# Patient Record
Sex: MALE | Race: WHITE | Employment: FULL TIME | URBAN - METROPOLITAN AREA
[De-identification: names, ages, dates, MRNs, and addresses within clinical notes are randomized per-mention and may not be internally consistent; named-entity substitution may affect disease eponyms.]

---

## 2018-04-05 ENCOUNTER — OFFICE VISIT (OUTPATIENT)
Dept: PODIATRY | Facility: CLINIC | Age: 63
End: 2018-04-05
Payer: COMMERCIAL

## 2018-04-05 VITALS
SYSTOLIC BLOOD PRESSURE: 131 MMHG | RESPIRATION RATE: 17 BRPM | WEIGHT: 185 LBS | DIASTOLIC BLOOD PRESSURE: 86 MMHG | HEIGHT: 67 IN | HEART RATE: 72 BPM | BODY MASS INDEX: 29.03 KG/M2

## 2018-04-05 DIAGNOSIS — Q66.52 CONGENITAL PES PLANUS OF LEFT FOOT: ICD-10-CM

## 2018-04-05 DIAGNOSIS — Q66.51 CONGENITAL PES PLANUS OF RIGHT FOOT: ICD-10-CM

## 2018-04-05 DIAGNOSIS — B35.1 ONYCHOMYCOSIS: ICD-10-CM

## 2018-04-05 DIAGNOSIS — M20.21 HALLUX RIGIDUS OF BOTH FEET: ICD-10-CM

## 2018-04-05 DIAGNOSIS — M21.969 ACQUIRED DEFORMITY OF FOOT, UNSPECIFIED LATERALITY: Primary | ICD-10-CM

## 2018-04-05 DIAGNOSIS — M20.22 HALLUX RIGIDUS OF BOTH FEET: ICD-10-CM

## 2018-04-05 DIAGNOSIS — B35.3 TINEA PEDIS OF BOTH FEET: ICD-10-CM

## 2018-04-05 PROCEDURE — L3000 FT INSERT UCB BERKELEY SHELL: HCPCS | Performed by: PODIATRIST

## 2018-04-05 PROCEDURE — 20605 DRAIN/INJ JOINT/BURSA W/O US: CPT | Performed by: PODIATRIST

## 2018-04-05 PROCEDURE — 99202 OFFICE O/P NEW SF 15 MIN: CPT | Performed by: PODIATRIST

## 2018-04-05 RX ORDER — TERBINAFINE HYDROCHLORIDE 250 MG/1
250 TABLET ORAL DAILY
Qty: 30 TABLET | Refills: 0 | Status: SHIPPED | OUTPATIENT
Start: 2018-04-05 | End: 2018-05-05

## 2018-04-05 NOTE — PROGRESS NOTES
Assessment/Plan:  Pain upon ambulation  Pes planus  Hallux rigidus  Mycosis of nail and skin  Plan  Patient educated on condition  Patient be started on oral and topical antifungal agent  Patient's feet have been casted for custom molded foot orthotics  Right 1st MPJ arthrocentesis done  1 cc of Kenalog 10 was injected without pain or complication  No problem-specific Assessment & Plan notes found for this encounter  Radha Paul MD - 08/17/2017 5:30 PM EDT  Formatting of this note may be different from the original   Subjective : The patient presents to the office with several complaints  Chief Complaint   Patient presents with    Eye Problem   Patient presents for left eye issue, patient thinks it may be spider bite     HPI  Aissatou aGry is a 62 y o  male who presents for :    1  Left eye pain  The the patient complains of left lower eyelid discomfort for the past 3-4 days  He denies trauma or injury  He is not sure if there was an insect bite  There is no trouble with his vision  He has not tried any over-the-counter treatment  2  Right forearm pain  He states he has developed pain in the right forearm over the extensor surface, over the past week  There has been no trauma, though upon further questioning he states, while working as a , he has been holding the button on his radio while singing for hours  3  Chest pain, unspecified type  The patient describes a bandlike feeling around his chest lasting a short duration, and intermittent for an unspecified time frame  This does not happen every day or every week  He does lift weights, somewhat heavy at times  There is no other radiation of the pain, and no socially aided symptoms      Past Medical History:   Diagnosis Date    Alopecia    Diverticulitis    Dizziness    Fatigue    Low back pain    Visual disturbance   Unspecified, NOS     Past Surgical History:   Procedure Laterality Date    COLONOSCOPY    HAIR TRANSPLANT Family History   Problem Relation Age of Onset    COPD Mother    No Known Problems Sister    No Known Problems Brother    No Known Problems Daughter    No Known Problems Son    No Known Problems Brother    No Known Problems Sister    No Known Problems Sister     Social History  Social History   Substance Use Topics    Smoking status: Never Smoker    Smokeless tobacco: Never Used    Alcohol use No     Allergies   Allergen Reactions    Penicillins     Current Outpatient Prescriptions   Medication Sig Dispense Refill    finasteride (PROSCAR) 5 mg tablet Take 1 tablet (5 mg total) by mouth daily  90 tablet 3    levoFLOXacin (LEVAQUIN) 500 mg tablet Take 1 tablet (500 mg total) by mouth daily  10 tablet 3    metroNIDAZOLE (FLAGYL) 250 mg tablet Take 1 tablet (250 mg total) by mouth 3 times a day  21 tablet 3     No current facility-administered medications for this visit  Review of Systems   Constitutional: Negative for chills and fever  HENT: Negative for ear pain and sore throat  Eyes: Negative for photophobia and pain  Respiratory: Negative for cough and shortness of breath  Cardiovascular: Negative for chest pain, palpitations and leg swelling  Gastrointestinal: Negative for abdominal pain and nausea  Endocrine: Negative for polydipsia, polyphagia and polyuria  Genitourinary: Negative for dysuria, flank pain, hematuria and urgency  Skin: Negative for rash and wound  Neurological: Negative for dizziness, seizures and headaches  Hematological: Negative for adenopathy  Does not bruise/bleed easily  Psychiatric/Behavioral: Negative for confusion and hallucinations  There are no diagnoses linked to this encounter  Subjective:      Patient ID: Olu Dempsey is a 65 y o  male  Patient has fungus of toenail  This has been there for years  Patient also has pain in his right big toe joint  This has been ongoing for years  No history of occult trauma          The following portions of the patient's history were reviewed and updated as appropriate: allergies, current medications, past family history, past medical history, past social history, past surgical history and problem list     Review of Systems      Objective: Foot Exam    General  General Appearance: appears stated age and healthy   Orientation: alert and oriented to person, place, and time   Affect: appropriate   Gait: antalgic       Right Foot/Ankle     Inspection and Palpation  Ecchymosis: none  Tenderness: great toe metatarsophalangeal joint   Swelling: great toe interphalangeal joint   Arch: pes planus  Hallux valgus: no  Hallux limitus: yes  Skin Exam: callus, dry skin and tinea; Neurovascular  Dorsalis pedis: 1+  Posterior tibial: 1+  Saphenous nerve sensation: normal  Tibial nerve sensation: normal  Superficial peroneal nerve sensation: normal  Deep peroneal nerve sensation: normal  Sural nerve sensation: normal  Achilles reflex: 2+  Babinski reflex: 2+    Comments  Patient has severe hallux rigidus of the right foot  Minimal range of motion of right 1st MPJNoted  Left Foot/Ankle      Inspection and Palpation  Ecchymosis: none  Tenderness: none   Arch: pes planus  Hallux valgus: no  Hallux limitus: yes  Skin Exam: callus, dry skin and tinea; Neurovascular  Dorsalis pedis: 1+  Posterior tibial: 1+  Saphenous nerve sensation: normal  Tibial nerve sensation: normal  Superficial peroneal nerve sensation: normal  Deep peroneal nerve sensation: normal  Sural nerve sensation: normal  Achilles reflex: 2+  Babinski reflex: 2+        Physical Exam   Constitutional: He appears well-developed  Cardiovascular: Normal rate and regular rhythm  Pulses:       Dorsalis pedis pulses are 1+ on the right side, and 1+ on the left side  Posterior tibial pulses are 1+ on the right side, and 1+ on the left side  Feet:   Right Foot:   Skin Integrity: Positive for callus and dry skin     Left Foot:   Skin Integrity: Positive for callus and dry skin  Neurological:   Reflex Scores:       Achilles reflexes are 2+ on the right side and 2+ on the left side  Skin:   Mycosis of hallux nail noted bilateral   Nails are thickened with yellow  There is lysis of the distal aspect

## 2018-05-10 ENCOUNTER — OFFICE VISIT (OUTPATIENT)
Dept: PODIATRY | Facility: CLINIC | Age: 63
End: 2018-05-10
Payer: COMMERCIAL

## 2018-05-10 VITALS
WEIGHT: 185 LBS | HEIGHT: 67 IN | DIASTOLIC BLOOD PRESSURE: 84 MMHG | SYSTOLIC BLOOD PRESSURE: 131 MMHG | HEART RATE: 74 BPM | BODY MASS INDEX: 29.03 KG/M2 | RESPIRATION RATE: 16 BRPM

## 2018-05-10 DIAGNOSIS — B35.3 TINEA PEDIS OF BOTH FEET: ICD-10-CM

## 2018-05-10 DIAGNOSIS — M20.21 HALLUX RIGIDUS OF BOTH FEET: ICD-10-CM

## 2018-05-10 DIAGNOSIS — M21.969 ACQUIRED DEFORMITY OF FOOT, UNSPECIFIED LATERALITY: Primary | ICD-10-CM

## 2018-05-10 DIAGNOSIS — M77.42 METATARSALGIA OF BOTH FEET: ICD-10-CM

## 2018-05-10 DIAGNOSIS — M20.22 HALLUX RIGIDUS OF BOTH FEET: ICD-10-CM

## 2018-05-10 DIAGNOSIS — M77.41 METATARSALGIA OF BOTH FEET: ICD-10-CM

## 2018-05-10 DIAGNOSIS — B35.1 ONYCHOMYCOSIS: ICD-10-CM

## 2018-05-10 PROCEDURE — 99213 OFFICE O/P EST LOW 20 MIN: CPT | Performed by: PODIATRIST

## 2018-05-10 RX ORDER — TERBINAFINE HYDROCHLORIDE 250 MG/1
250 TABLET ORAL DAILY
Qty: 30 TABLET | Refills: 0 | Status: SHIPPED | OUTPATIENT
Start: 2018-05-10 | End: 2018-06-09

## 2018-05-10 NOTE — PROGRESS NOTES
Assessment/Plan:  Mycosis, resolving  Pes planus  Pain  Hallux rigidus  Plan  Refill medication ordered  Patient will use orthotics daily  No problem-specific Assessment & Plan notes found for this encounter  There are no diagnoses linked to this encounter  Subjective:      Patient ID: Joyce Roberts is a 65 y o  male  Patient is doing better  He took medication as prescribed  He only has occasional pain in both big toe joints  The following portions of the patient's history were reviewed and updated as appropriate: allergies, current medications, past family history, past medical history, past social history, past surgical history and problem list     Review of Systems      Objective: Foot ExamPhysical Exam   Skin:   Mycosis of nail resolving slowly    Line of demarcation noted

## 2019-08-07 ENCOUNTER — OFFICE VISIT (OUTPATIENT)
Dept: PODIATRY | Facility: CLINIC | Age: 64
End: 2019-08-07
Payer: COMMERCIAL

## 2019-08-07 DIAGNOSIS — B35.1 ONYCHOMYCOSIS: ICD-10-CM

## 2019-08-07 DIAGNOSIS — L03.032 CELLULITIS OF GREAT TOE, LEFT: Primary | ICD-10-CM

## 2019-08-07 DIAGNOSIS — L60.0 INGROWN TOENAIL: ICD-10-CM

## 2019-08-07 PROCEDURE — 99213 OFFICE O/P EST LOW 20 MIN: CPT | Performed by: PODIATRIST

## 2019-08-07 RX ORDER — FINASTERIDE 5 MG/1
TABLET, FILM COATED ORAL
COMMUNITY
Start: 2019-08-05

## 2019-08-07 RX ORDER — DOXYCYCLINE HYCLATE 100 MG/1
100 CAPSULE ORAL EVERY 12 HOURS SCHEDULED
Qty: 14 CAPSULE | Refills: 0 | Status: SHIPPED | OUTPATIENT
Start: 2019-08-07 | End: 2019-08-14

## 2019-08-07 NOTE — PROGRESS NOTES
Assessment/Plan:    Partial nail avulsion left hallux tibial border under ethyl chloride anesthesia  Applied Neosporin dressing    Patient will soak foot in warm water and Epsom salt daily Neosporin dressing daily discussed possible need for matricectomy  Discussed that he needs to go for blood work Rx CMP will need to discontinue herbal supplements before beginning Lamisil therapy  Discussed risk of liver damage discussed side effects of medication  Discussed that he will need Lamisil for  3 months for treatment of nail fungus  Follow-up 1 week     Diagnoses and all orders for this visit:    Cellulitis of great toe, left  -     doxycycline hyclate (VIBRAMYCIN) 100 mg capsule; Take 1 capsule (100 mg total) by mouth every 12 (twelve) hours for 7 days    Ingrown toenail    Onychomycosis    Other orders  -     finasteride (PROSCAR) 5 mg tablet; TAKE 1 TABLET DAILY          Subjective:      Patient ID: Claire Muniz is a 59 y o  male  Patient is a  patient is a chronic ingrown nail of the left big toe medial border for the past month  Patient has not been treating this  Toe is red and swollen  There is significant pain  Patient also has significant onychomycosis big toes bilateral   Has tried topical over-the-counter for several months but this has not been effective  Patient does weight lifting patient takes creatinine and numerous vitamin and herbal supplements  Patient does not drink alcohol      No past medical history on file  Current Outpatient Medications:     finasteride (PROSCAR) 5 mg tablet, TAKE 1 TABLET DAILY, Disp: , Rfl:     doxycycline hyclate (VIBRAMYCIN) 100 mg capsule, Take 1 capsule (100 mg total) by mouth every 12 (twelve) hours for 7 days, Disp: 14 capsule, Rfl: 0    No past surgical history on file      Allergies   Allergen Reactions    Penicillins        Patient Active Problem List   Diagnosis    Acquired deformity of foot    Hallux rigidus of both feet    Congenital pes planus of right foot    Congenital pes planus of left foot    Onychomycosis    Tinea pedis of both feet    Metatarsalgia of both feet       Review of Systems   Constitutional: Negative  HENT: Negative  Eyes: Negative  Respiratory: Negative  Cardiovascular: Negative  Gastrointestinal: Negative  Endocrine: Negative  Genitourinary: Negative  Musculoskeletal: Negative  Skin: Negative  Allergic/Immunologic: Negative  Neurological: Negative  Hematological: Negative  Psychiatric/Behavioral: Negative  Objective:  Patient's shoes and socks were removed, feet examined  There were no vitals taken for this visit  Foot Exam    General  General Appearance: appears stated age and healthy   Orientation: alert and oriented to person, place, and time   Affect: appropriate   Gait: unimpaired       Right Foot/Ankle     Inspection and Palpation  Arch: pes planus  Hallux limitus: yes    Neurovascular  Dorsalis pedis: 2+  Posterior tibial: 2+      Left Foot/Ankle      Inspection and Palpation  Arch: pes planus  Hallux limitus: yes    Neurovascular  Dorsalis pedis: 2+  Posterior tibial: 2+          Right Foot/Ankle   Right Foot Inspection        Vascular    The right DP pulse is 2+  The right PT pulse is 2+  Right Toe  - Comprehensive Exam  Arch: pes planus  Hallux limitus: yes    Left Foot/Ankle  Left Foot Inspection                                             Vascular    The left DP pulse is 2+  The left PT pulse is 2+  Left Toe  - Comprehensive Exam  Arch: pes planus  Hallux limitus: yes         Physical Exam   Cardiovascular:   Pulses:       Dorsalis pedis pulses are 2+ on the right side, and 2+ on the left side  Posterior tibial pulses are 2+ on the right side, and 2+ on the left side           Procedures     Left hallux tibial border positive ingrown, positive paronychia positive erythema to IPJ, negative purulence negative ascending cellulitis    Bilateral hallux nails are thickened, discolored, dystrophic  Mild hammertoes bilateral  Moderate pes planus bilateral  Muscle strength 5/5 all groups bilateral feet and ankles

## 2019-08-21 ENCOUNTER — OFFICE VISIT (OUTPATIENT)
Dept: PODIATRY | Facility: CLINIC | Age: 64
End: 2019-08-21
Payer: COMMERCIAL

## 2019-08-21 DIAGNOSIS — L60.0 INGROWN TOENAIL: ICD-10-CM

## 2019-08-21 DIAGNOSIS — B35.1 ONYCHOMYCOSIS: Primary | ICD-10-CM

## 2019-08-21 PROCEDURE — 99213 OFFICE O/P EST LOW 20 MIN: CPT | Performed by: PODIATRIST

## 2019-08-21 RX ORDER — TERBINAFINE HYDROCHLORIDE 250 MG/1
250 TABLET ORAL DAILY
Qty: 30 TABLET | Refills: 1 | Status: SHIPPED | OUTPATIENT
Start: 2019-08-21 | End: 2019-09-20

## 2019-08-21 NOTE — PROGRESS NOTES
Assessment/Plan:    Patient will not begin pill in till after blood work  Discussed risk of liver damage discussed side effects of medication  Patient will repeat blood work after being on medication for 1 month  Follow-up 1 month     Diagnoses and all orders for this visit:    Onychomycosis  -     terbinafine (LamISIL) 250 mg tablet; Take 1 tablet (250 mg total) by mouth daily for 30 days    Ingrown toenail          Subjective:      Patient ID: Fredis Wright is a 59 y o  male  Patient is follow-up for ingrown nail left big toe  Patient finish course of antibiotics  Patient has had chronic nail fungus for several years  No past medical history on file  Current Outpatient Medications:     finasteride (PROSCAR) 5 mg tablet, TAKE 1 TABLET DAILY, Disp: , Rfl:     terbinafine (LamISIL) 250 mg tablet, Take 1 tablet (250 mg total) by mouth daily for 30 days, Disp: 30 tablet, Rfl: 1    No past surgical history on file  Allergies   Allergen Reactions    Penicillins        Patient Active Problem List   Diagnosis    Acquired deformity of foot    Hallux rigidus of both feet    Congenital pes planus of right foot    Congenital pes planus of left foot    Onychomycosis    Tinea pedis of both feet    Metatarsalgia of both feet       Review of Systems   Constitutional: Negative  HENT: Negative  Eyes: Negative  Respiratory: Negative  Cardiovascular: Negative  Gastrointestinal: Negative  Endocrine: Negative  Genitourinary: Negative  Musculoskeletal: Negative  Skin: Negative  Allergic/Immunologic: Negative  Neurological: Negative  Hematological: Negative  Psychiatric/Behavioral: Negative  Objective:  Patient's shoes and socks were removed, feet examined  There were no vitals taken for this visit        Foot Exam    General  General Appearance: appears stated age and healthy   Orientation: alert and oriented to person, place, and time   Affect: appropriate Gait: unimpaired       Right Foot/Ankle     Inspection and Palpation  Arch: pes planus  Hallux limitus: yes    Neurovascular  Dorsalis pedis: 2+  Posterior tibial: 2+      Left Foot/Ankle      Inspection and Palpation  Arch: pes planus  Hallux limitus: yes    Neurovascular  Dorsalis pedis: 2+  Posterior tibial: 2+          Right Foot/Ankle   Right Foot Inspection        Vascular    The right DP pulse is 2+  The right PT pulse is 2+  Right Toe  - Comprehensive Exam  Arch: pes planus  Hallux limitus: yes    Left Foot/Ankle  Left Foot Inspection                                             Vascular    The left DP pulse is 2+  The left PT pulse is 2+  Left Toe  - Comprehensive Exam  Arch: pes planus  Hallux limitus: yes         Physical Exam   Cardiovascular:   Pulses:       Dorsalis pedis pulses are 2+ on the right side, and 2+ on the left side  Posterior tibial pulses are 2+ on the right side, and 2+ on the left side           Procedures     Left hallux tibial border resolving ingrown, no erythema no exudate no signs of infection  Bilateral hallux nails are thickened, discolored, dystrophic  Mild hammertoes bilateral  Moderate pes planus bilateral  Muscle strength 5/5 all groups bilateral feet and ankles

## 2019-08-29 LAB
ALBUMIN SERPL-MCNC: 4.5 G/DL (ref 3.6–4.8)
ALBUMIN/GLOB SERPL: 1.8 {RATIO} (ref 1.2–2.2)
ALP SERPL-CCNC: 62 IU/L (ref 39–117)
ALT SERPL-CCNC: 21 IU/L (ref 0–44)
AST SERPL-CCNC: 25 IU/L (ref 0–40)
BILIRUB SERPL-MCNC: 0.5 MG/DL (ref 0–1.2)
BUN SERPL-MCNC: 14 MG/DL (ref 8–27)
BUN/CREAT SERPL: 14 (ref 10–24)
CALCIUM SERPL-MCNC: 9.5 MG/DL (ref 8.6–10.2)
CHLORIDE SERPL-SCNC: 104 MMOL/L (ref 96–106)
CO2 SERPL-SCNC: 23 MMOL/L (ref 20–29)
CREAT SERPL-MCNC: 1.02 MG/DL (ref 0.76–1.27)
GLOBULIN SER-MCNC: 2.5 G/DL (ref 1.5–4.5)
GLUCOSE SERPL-MCNC: 105 MG/DL (ref 65–99)
POTASSIUM SERPL-SCNC: 4.8 MMOL/L (ref 3.5–5.2)
PROT SERPL-MCNC: 7 G/DL (ref 6–8.5)
SL AMB EGFR AFRICAN AMERICAN: 91 ML/MIN/1.73
SL AMB EGFR NON AFRICAN AMERICAN: 78 ML/MIN/1.73
SODIUM SERPL-SCNC: 141 MMOL/L (ref 134–144)

## 2019-09-03 ENCOUNTER — TELEPHONE (OUTPATIENT)
Dept: PODIATRY | Facility: CLINIC | Age: 64
End: 2019-09-03

## 2023-06-15 ENCOUNTER — HOSPITAL ENCOUNTER (OUTPATIENT)
Dept: RADIOLOGY | Facility: HOSPITAL | Age: 68
End: 2023-06-15
Payer: OTHER MISCELLANEOUS

## 2023-06-15 DIAGNOSIS — T88.7XXA DRUG-INDUCED DISORDER OF REFRACTION AND ACCOMMODATION: ICD-10-CM

## 2023-06-15 DIAGNOSIS — H53.8 DRUG-INDUCED DISORDER OF REFRACTION AND ACCOMMODATION: ICD-10-CM

## 2023-06-15 DIAGNOSIS — S06.0X0A CONCUSSION WITHOUT LOSS OF CONSCIOUSNESS, INITIAL ENCOUNTER: ICD-10-CM

## 2023-06-15 DIAGNOSIS — S20.223A CONTUSION OF BOTH SIDES OF BACK WALL OF THORAX, INITIAL ENCOUNTER: ICD-10-CM
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
## 2023-06-15 PROCEDURE — 72070 X-RAY EXAM THORAC SPINE 2VWS: CPT

## 2023-06-15 PROCEDURE — 72114 X-RAY EXAM L-S SPINE BENDING: CPT

## 2023-06-15 PROCEDURE — 72170 X-RAY EXAM OF PELVIS: CPT

## 2024-05-16 ENCOUNTER — APPOINTMENT (OUTPATIENT)
Dept: LAB | Facility: CLINIC | Age: 69
End: 2024-05-16
Payer: MEDICARE

## 2024-05-16 DIAGNOSIS — R53.83 OTHER FATIGUE: ICD-10-CM

## 2024-05-16 DIAGNOSIS — M25.569 KNEE PAIN, UNSPECIFIED CHRONICITY, UNSPECIFIED LATERALITY: ICD-10-CM

## 2024-05-16 DIAGNOSIS — Z12.5 SPECIAL SCREENING FOR MALIGNANT NEOPLASM OF PROSTATE: ICD-10-CM

## 2024-05-16 DIAGNOSIS — E78.5 HYPERLIPIDEMIA, UNSPECIFIED HYPERLIPIDEMIA TYPE: ICD-10-CM

## 2024-05-16 DIAGNOSIS — G47.00 INSOMNIA, UNSPECIFIED TYPE: ICD-10-CM

## 2024-05-16 LAB — TSH SERPL DL<=0.05 MIU/L-ACNC: 5.11 UIU/ML (ref 0.45–4.5)

## 2024-05-16 PROCEDURE — 84443 ASSAY THYROID STIM HORMONE: CPT

## 2024-07-10 ENCOUNTER — OFFICE VISIT (OUTPATIENT)
Dept: URGENT CARE | Facility: CLINIC | Age: 69
End: 2024-07-10
Payer: MEDICARE

## 2024-07-10 VITALS
TEMPERATURE: 97.9 F | WEIGHT: 190 LBS | HEIGHT: 66 IN | OXYGEN SATURATION: 98 % | HEART RATE: 72 BPM | SYSTOLIC BLOOD PRESSURE: 144 MMHG | BODY MASS INDEX: 30.53 KG/M2 | RESPIRATION RATE: 16 BRPM | DIASTOLIC BLOOD PRESSURE: 91 MMHG

## 2024-07-10 DIAGNOSIS — L23.7 POISON IVY DERMATITIS: Primary | ICD-10-CM

## 2024-07-10 PROCEDURE — 99203 OFFICE O/P NEW LOW 30 MIN: CPT

## 2024-07-10 RX ORDER — KETOCONAZOLE 20 MG/G
CREAM TOPICAL
COMMUNITY
Start: 2024-05-13

## 2024-07-10 RX ORDER — METRONIDAZOLE 250 MG/1
TABLET ORAL
COMMUNITY
Start: 2024-05-13

## 2024-07-10 RX ORDER — LEVOTHYROXINE SODIUM 0.03 MG/1
TABLET ORAL
COMMUNITY
Start: 2024-06-10

## 2024-07-10 RX ORDER — PREDNISONE 10 MG/1
TABLET ORAL
Qty: 39 TABLET | Refills: 0 | Status: SHIPPED | OUTPATIENT
Start: 2024-07-10 | End: 2024-07-22

## 2024-07-10 NOTE — PATIENT INSTRUCTIONS
Steroids as prescribed   Topical anti-itch creams or sprays to affected area as needed   Claritin or Zyrtex daily while rash persists  Avoid touching affected areas    Follow up with PCP in 3-5 days.  Proceed to  ER if symptoms worsen.

## 2024-07-10 NOTE — PROGRESS NOTES
[facility]        NAME: Christopher Perez is a 68 y.o. male  : 1955    MRN: [id number]  DATE: July 10, 2024  TIME: 1:53 PM    Assessment and Plan   Poison ivy dermatitis [L23.7]  1. Poison ivy dermatitis  predniSONE 10 mg tablet            Patient Instructions     Steroids as prescribed   Topical anti-itch creams or sprays to affected area as needed   Claritin or Zyrtex daily while rash persists  Avoid touching affected areas    Follow up with PCP in 3-5 days.  Proceed to  ER if symptoms worsen.      If tests are performed, our office will contact you with results only if changes need to made to the care plan discussed with you at the visit. You can review your full results on [facility].    Chief Complaint     Chief Complaint   Patient presents with    Rash     Pt thinks he has poison ivy on his arms that started 4 days ago. It is spreading to his neck, and back. Pt has tried many over the counter creams.          History of Present Illness       Rash  This is a new problem. The current episode started in the past 7 days. The problem has been gradually worsening since onset. The affected locations include the left arm, right arm, neck and back. The rash is characterized by redness, itchiness and draining. He was exposed to poison ivy/oak. The rash first occurred at home. Associated symptoms include itching. Pertinent negatives include no congestion, cough, fever, rhinorrhea, shortness of breath, sore throat or vomiting. Past treatments include nothing.       Review of Systems   Review of Systems   Constitutional:  Negative for chills and fever.   HENT:  Negative for congestion, postnasal drip, rhinorrhea, sinus pressure, sore throat and trouble swallowing.    Respiratory:  Negative for cough, chest tightness and shortness of breath.    Cardiovascular:  Negative for chest pain and palpitations.   Gastrointestinal:  Negative for abdominal pain, nausea and vomiting.   Genitourinary:  Negative for  "difficulty urinating.   Musculoskeletal:  Negative for myalgias.   Skin:  Positive for itching and rash.   Neurological:  Negative for dizziness and headaches.         Current Medications       Current Outpatient Medications:     finasteride (PROSCAR) 5 mg tablet, TAKE 1 TABLET DAILY, Disp: , Rfl:     ketoconazole (NIZORAL) 2 % cream, , Disp: , Rfl:     levothyroxine 25 mcg tablet, , Disp: , Rfl:     metroNIDAZOLE (FLAGYL) 250 mg tablet, , Disp: , Rfl:     predniSONE 10 mg tablet, Take 6 tablets (60 mg total) by mouth daily for 3 days, THEN 4 tablets (40 mg total) daily for 3 days, THEN 2 tablets (20 mg total) daily for 3 days, THEN 1 tablet (10 mg total) daily for 3 days., Disp: 39 tablet, Rfl: 0    Current Allergies     Allergies as of 07/10/2024 - Reviewed 07/10/2024   Allergen Reaction Noted    Penicillins  02/18/2016            The following portions of the patient's history were reviewed and updated as appropriate: allergies, current medications, past family history, past medical history, past social history, past surgical history and problem list.     Past Medical History:   Diagnosis Date    Diverticulitis        History reviewed. No pertinent surgical history.    Family History   Problem Relation Age of Onset    Cancer Mother          Medications have been verified.        Objective   /91   Pulse 72   Temp 97.9 °F (36.6 °C)   Resp 16   Ht 5' 6\" (1.676 m)   Wt 86.2 kg (190 lb)   SpO2 98%   BMI 30.67 kg/m²        Physical Exam     Physical Exam  Constitutional:       General: He is not in acute distress.  HENT:      Head: Normocephalic.      Nose: Nose normal.   Eyes:      Pupils: Pupils are equal, round, and reactive to light.   Cardiovascular:      Rate and Rhythm: Normal rate and regular rhythm.      Pulses: Normal pulses.      Heart sounds: Normal heart sounds.   Pulmonary:      Effort: Pulmonary effort is normal.      Breath sounds: Normal breath sounds.   Abdominal:      General: Abdomen is " flat.   Musculoskeletal:         General: Normal range of motion.   Skin:     General: Skin is warm and dry.      Capillary Refill: Capillary refill takes less than 2 seconds.      Findings: Rash (scattered poison ivy rash to bilateral arms, neck, and back) present.   Neurological:      Mental Status: He is alert and oriented to person, place, and time.